# Patient Record
Sex: MALE | Race: BLACK OR AFRICAN AMERICAN | NOT HISPANIC OR LATINO | Employment: STUDENT | URBAN - METROPOLITAN AREA
[De-identification: names, ages, dates, MRNs, and addresses within clinical notes are randomized per-mention and may not be internally consistent; named-entity substitution may affect disease eponyms.]

---

## 2023-02-25 PROBLEM — H52.31 ANISOMETROPIA: Status: ACTIVE | Noted: 2023-02-25

## 2023-02-25 PROBLEM — R59.0 LYMPHADENOPATHY, AXILLARY: Status: ACTIVE | Noted: 2023-02-25

## 2023-02-25 PROBLEM — H52.03 AXIAL HYPERMETROPIA OF BOTH EYES: Status: ACTIVE | Noted: 2023-02-25

## 2023-02-25 PROBLEM — E83.19 OTHER DISORDERS OF IRON METABOLISM: Status: ACTIVE | Noted: 2023-02-25

## 2023-02-25 PROBLEM — Q21.0 PERIMEMBRANOUS VENTRICULAR SEPTAL DEFECT (HHS-HCC): Status: ACTIVE | Noted: 2023-02-25

## 2023-02-25 PROBLEM — R46.89 BEHAVIOR CONCERN: Status: ACTIVE | Noted: 2023-02-25

## 2023-02-25 PROBLEM — R01.1 MURMUR: Status: ACTIVE | Noted: 2023-02-25

## 2023-02-25 PROBLEM — G47.33 OBSTRUCTIVE SLEEP APNEA SYNDROME: Status: ACTIVE | Noted: 2023-02-25

## 2023-02-25 PROBLEM — L85.3 DRY SKIN DERMATITIS: Status: ACTIVE | Noted: 2023-02-25

## 2023-02-25 PROBLEM — L02.412 ABSCESS OF AXILLA, LEFT: Status: ACTIVE | Noted: 2023-02-25

## 2023-02-25 PROBLEM — H52.13 MYOPIA OF BOTH EYES: Status: ACTIVE | Noted: 2023-02-25

## 2023-02-25 PROBLEM — J06.9 URI (UPPER RESPIRATORY INFECTION): Status: ACTIVE | Noted: 2023-02-25

## 2023-02-25 PROBLEM — Q90.9 TRISOMY 21 (HHS-HCC): Status: ACTIVE | Noted: 2023-02-25

## 2023-02-25 PROBLEM — H10.10 ALLERGIC CONJUNCTIVITIS: Status: ACTIVE | Noted: 2023-02-25

## 2023-02-25 PROBLEM — J30.9 ALLERGIC RHINITIS: Status: ACTIVE | Noted: 2023-02-25

## 2023-02-25 PROBLEM — R25.8 NOCTURNAL LEG MOVEMENTS: Status: ACTIVE | Noted: 2023-02-25

## 2023-02-25 PROBLEM — F80.9 DELAYED SPEECH: Status: ACTIVE | Noted: 2023-02-25

## 2023-02-25 PROBLEM — L70.9 ACNE: Status: ACTIVE | Noted: 2023-02-25

## 2023-02-25 PROBLEM — L02.411 ABSCESS OF AXILLA, RIGHT: Status: ACTIVE | Noted: 2023-02-25

## 2023-02-25 PROBLEM — H52.201 ASTIGMATISM OF RIGHT EYE: Status: ACTIVE | Noted: 2023-02-25

## 2023-02-25 PROBLEM — J30.2 SEASONAL ALLERGIES: Status: ACTIVE | Noted: 2023-02-25

## 2023-02-25 PROBLEM — F81.89 DEVELOPMENTAL NON-VERBAL DISORDER: Status: ACTIVE | Noted: 2023-02-25

## 2023-02-25 PROBLEM — L84 CALLUS OF FOOT: Status: ACTIVE | Noted: 2023-02-25

## 2023-02-25 PROBLEM — L73.2 HIDRADENITIS SUPPURATIVA: Status: ACTIVE | Noted: 2023-02-25

## 2023-02-25 RX ORDER — CETIRIZINE HYDROCHLORIDE 5 MG/5ML
10 SOLUTION ORAL DAILY PRN
COMMUNITY
Start: 2018-05-14

## 2023-02-25 RX ORDER — BENZOYL PEROXIDE 100 MG/ML
LIQUID TOPICAL
COMMUNITY
Start: 2019-01-14

## 2023-03-06 ENCOUNTER — OFFICE VISIT (OUTPATIENT)
Dept: PRIMARY CARE | Facility: CLINIC | Age: 23
End: 2023-03-06
Payer: COMMERCIAL

## 2023-03-06 VITALS
HEART RATE: 54 BPM | BODY MASS INDEX: 21.9 KG/M2 | DIASTOLIC BLOOD PRESSURE: 66 MMHG | HEIGHT: 61 IN | OXYGEN SATURATION: 100 % | SYSTOLIC BLOOD PRESSURE: 106 MMHG | TEMPERATURE: 98.1 F | WEIGHT: 116 LBS

## 2023-03-06 DIAGNOSIS — Z00.00 PHYSICAL EXAM, ROUTINE: Primary | ICD-10-CM

## 2023-03-06 DIAGNOSIS — F80.9 DELAYED SPEECH: ICD-10-CM

## 2023-03-06 DIAGNOSIS — R01.1 MURMUR: ICD-10-CM

## 2023-03-06 DIAGNOSIS — F81.89 DEVELOPMENTAL NON-VERBAL DISORDER: ICD-10-CM

## 2023-03-06 PROCEDURE — 99213 OFFICE O/P EST LOW 20 MIN: CPT | Performed by: FAMILY MEDICINE

## 2023-03-06 RX ORDER — ACETAMINOPHEN 160 MG
10 TABLET,CHEWABLE ORAL
COMMUNITY
Start: 2022-05-17

## 2023-03-06 NOTE — PROGRESS NOTES
"Subjective   Chief complaint: Declan Brown is a 22 y.o. male who presents for Annual Exam.  Patient Active Problem List   Diagnosis    Abscess of axilla, left    Abscess of axilla, right    Acne    Allergic conjunctivitis    Allergic rhinitis    Astigmatism of right eye    Anisometropia    Axial hypermetropia of both eyes    Behavior concern    Callus of foot    Delayed speech    Developmental non-verbal disorder    Dry skin dermatitis    Hidradenitis suppurativa    Lymphadenopathy, axillary    Murmur    Myopia of both eyes    Nocturnal leg movements    Obstructive sleep apnea syndrome    Other disorders of iron metabolism    Perimembranous ventricular septal defect    Seasonal allergies    Trisomy 21    URI (upper respiratory infection)     HPI:  HPI  Here with his care giver and reports no new symptoms or issues, needs a physical for starting adult care program. Recently finished high school.Patient is non verbal.        Review of Systems  Unable to obtain, does not seem to be in pain.    Objective   /66 (BP Location: Right arm, Patient Position: Sitting)   Pulse 54   Temp 36.7 °C (98.1 °F) (Temporal)   Ht 1.549 m (5' 1\")   Wt 52.6 kg (116 lb)   SpO2 100%   BMI 21.92 kg/m²     General Appearance:     no distress, appears smaller than stated age   Head:    Normocephalic, without obvious abnormality, atraumatic   Ears:    Normal TM's and external ear canals, both ears   Throat:   Lips, mucosa, and tongue normal; teeth and gums normal   Neck:   Supple, symmetrical, trachea midline, no adenopathy;        thyroid:  No enlargement/tenderness/nodules; no carotid    bruit or JVD   Lungs:     Clear to auscultation bilaterally, respirations unlabored   Heart:    Regular rate and rhythm, Systolic murmur.   Abdomen:     Soft, non-tender, bowel sounds active all four quadrants,     no masses, no organomegaly   Extremities:   Extremities normal, atraumatic, no cyanosis or edema            I have reviewed and " reconciled the medication list with the patient today.   Current Outpatient Medications:     loratadine (Claritin) 5 mg/5 mL syrup, Take 10 mL (10 mg) by mouth., Disp: , Rfl:     benzoyl peroxide (Benzac AC) 10 % external wash, Please see attached for detailed directions, Disp: , Rfl:     cetirizine 5 mg/5 mL solution, Take 10 mL by mouth once daily as needed (for allergies)., Disp: , Rfl:      Imaging:  No results found.     Labs reviewed:    Lab Results   Component Value Date    WBC 2.6 (L) 11/02/2021    HGB 14.1 11/02/2021    HCT 43.3 11/02/2021     11/02/2021    CHOL 139 11/02/2021    TRIG 51 11/02/2021    HDL 47.8 11/02/2021    ALT 15 11/02/2021    AST 18 11/02/2021     11/02/2021    K 4.7 11/02/2021     11/02/2021    CREATININE 1.07 11/02/2021    BUN 12 11/02/2021    CO2 31 11/02/2021    TSH 0.85 04/26/2019    HGBA1C 5.1 11/02/2021       Assessment/Plan   Problem List Items Addressed This Visit          Nervous    Delayed speech:unchanged, completed [employer] school       Circulatory    Murmur: ASD: cardiology follow up.       Other    Developmental non-verbal disorder     Other Visit Diagnoses       Physical exam, routine    -  Primary: up to date on flu and Covid vaccines          Diagnosis and Management discussed with the patient.  Patient agreeable with plan.  Patient advised to Return to clinic with new or unresolved symptoms.  Miles Richardson MD

## 2023-08-02 ENCOUNTER — APPOINTMENT (OUTPATIENT)
Dept: LAB | Facility: LAB | Age: 23
End: 2023-08-02
Payer: COMMERCIAL

## 2023-08-02 LAB — THYROTROPIN (MIU/L) IN SER/PLAS BY DETECTION LIMIT <= 0.05 MIU/L: 2.1 MIU/L (ref 0.44–3.98)

## 2024-01-10 ENCOUNTER — APPOINTMENT (OUTPATIENT)
Dept: DERMATOLOGY | Facility: CLINIC | Age: 24
End: 2024-01-10
Payer: COMMERCIAL

## 2024-01-27 DIAGNOSIS — L73.2 HIDRADENITIS SUPPURATIVA: Primary | ICD-10-CM

## 2024-01-29 RX ORDER — BENZOYL PEROXIDE 50 MG/ML
LIQUID TOPICAL
Qty: 227 G | Refills: 11 | Status: SHIPPED | OUTPATIENT
Start: 2024-01-29

## 2024-01-29 RX ORDER — DOXYCYCLINE 100 MG/1
TABLET ORAL
Qty: 180 TABLET | Refills: 3 | Status: SHIPPED | OUTPATIENT
Start: 2024-01-29

## 2024-03-21 ENCOUNTER — OFFICE VISIT (OUTPATIENT)
Dept: PRIMARY CARE | Facility: CLINIC | Age: 24
End: 2024-03-21
Payer: COMMERCIAL

## 2024-03-21 VITALS
WEIGHT: 126 LBS | HEIGHT: 61 IN | BODY MASS INDEX: 23.79 KG/M2 | SYSTOLIC BLOOD PRESSURE: 102 MMHG | DIASTOLIC BLOOD PRESSURE: 63 MMHG | HEART RATE: 60 BPM | TEMPERATURE: 98 F | OXYGEN SATURATION: 100 %

## 2024-03-21 DIAGNOSIS — Q90.9 TRISOMY 21 (HHS-HCC): Primary | ICD-10-CM

## 2024-03-21 PROCEDURE — 1036F TOBACCO NON-USER: CPT

## 2024-03-21 PROCEDURE — 99213 OFFICE O/P EST LOW 20 MIN: CPT

## 2024-03-21 ASSESSMENT — PATIENT HEALTH QUESTIONNAIRE - PHQ9
1. LITTLE INTEREST OR PLEASURE IN DOING THINGS: NOT AT ALL
SUM OF ALL RESPONSES TO PHQ9 QUESTIONS 1 AND 2: 0
2. FEELING DOWN, DEPRESSED OR HOPELESS: NOT AT ALL

## 2024-03-21 ASSESSMENT — PAIN SCALES - GENERAL: PAINLEVEL: 0-NO PAIN

## 2024-03-21 ASSESSMENT — ENCOUNTER SYMPTOMS
LOSS OF SENSATION IN FEET: 0
OCCASIONAL FEELINGS OF UNSTEADINESS: 0
DEPRESSION: 0

## 2024-03-21 NOTE — PROGRESS NOTES
Precepting Note  Declan Brown  [id number]    Patient was seen in [facility] Family Medicine residency clinic today as part of a multidisciplinary teaching team. I participated in this patient's care as a bertha precepting physician.    22 yo M w/ trisomy 21 here for follow up and guardianship paper. Mother is guardian. Will screen for atlantoaxial axis and lymphoma given his increased risk.    Agreeable with plan as discussed with resident Ramirez Dooley MD and attending provider, Dr. Quiroga.      Carlee Eden MD  Family Medicine, PGY-3

## 2024-03-21 NOTE — PROGRESS NOTES
"Subjective   Patient ID: Declan Brown is a 23 y.o. male who presents for Follow-up (Pt needs paper work filled per pt mother).    Pt presents today with his mother to have paperwork filled out in regards to guardianship. Per pts mother (pt non-verbal) pt is otherwise in good health and has been following with cardiology for his heart murmur and dermatology for his bilateral axillary abscesses which have been doing well on medication.          Review of Systems  - pt non-verbal, unable to assess    Objective   /63 (BP Location: Right arm, Patient Position: Sitting)   Pulse 60   Temp 36.7 °C (98 °F)   Ht 1.549 m (5' 1\")   Wt 57.2 kg (126 lb)   SpO2 100%   BMI 23.81 kg/m²     Physical Exam  Constitutional:       General: He is not in acute distress.     Appearance: Normal appearance. He is not ill-appearing.   Cardiovascular:      Rate and Rhythm: Normal rate and regular rhythm.      Pulses: Normal pulses.      Heart sounds: Murmur heard.      No gallop.   Pulmonary:      Effort: Pulmonary effort is normal.      Breath sounds: Normal breath sounds. No stridor. No wheezing.   Abdominal:      General: Abdomen is flat. Bowel sounds are normal. There is no distension.      Palpations: Abdomen is soft. There is no mass.      Tenderness: There is no abdominal tenderness. There is no guarding.   Skin:     General: Skin is warm and dry.      Coloration: Skin is not jaundiced or pale.      Findings: No erythema or rash.      Comments: Pt did not allow examination of axillae    Neurological:      General: No focal deficit present.      Mental Status: He is alert and oriented to person, place, and time.      Comments: Non-verbal at baseline   Psychiatric:         Mood and Affect: Mood normal.         Behavior: Behavior normal.         Assessment/Plan   Problem List Items Addressed This Visit       Trisomy 21 - Primary    Relevant Orders    CBC and Auto Differential    XR cervical spine complete 4-5 views "     #Trisomy 21  - Pt already following with cardiology for VSD and audible murmur on auscultation  - Pt will have CBC w/ diff ordered and C-spine Xray's ordered for routine screening   - Pt will have guardianship paperwork completed per mothers request at this visit.  - Discussed future follow-up and routine screening intervals for CBC as every other year and C-spine imagine as every 3-5 years if everything remains within normal limits.   - All questions and concerns have answered at this time      Patient seen and discussed with attending physician Dr. Quiroga and Dr. Karey Dooley MD  Family Medicine - PGY 1

## 2024-03-26 NOTE — PROGRESS NOTES
I saw and evaluated the patient. I personally obtained the key and critical portions of the history and physical exam or was physically present for key and critical portions performed by the resident/fellow. I reviewed the resident/fellow's documentation and discussed the patient with the resident/fellow. I agree with the resident/fellow's medical decision making as documented in the note.    Cleveland Quiroga MD

## 2024-08-30 ENCOUNTER — TELEPHONE (OUTPATIENT)
Dept: PRIMARY CARE | Facility: CLINIC | Age: 24
End: 2024-08-30

## 2024-08-30 NOTE — TELEPHONE ENCOUNTER
Pt's mom came in stating she was called for jury duty, but is unable to due to taking care of the pt. Could you write her a letter stating she cannot go to jury duty? She got one filled out last time by Dr. Lambert. She said you can fax it over to them: [phone]  Her jury number is [id number] thank you!

## 2024-09-04 ENCOUNTER — APPOINTMENT (OUTPATIENT)
Dept: DERMATOLOGY | Facility: CLINIC | Age: 24
End: 2024-09-04
Payer: COMMERCIAL

## 2024-09-04 DIAGNOSIS — L28.1 PRURIGO NODULARIS: Primary | ICD-10-CM

## 2024-09-04 PROCEDURE — 99214 OFFICE O/P EST MOD 30 MIN: CPT | Performed by: DERMATOLOGY

## 2024-09-04 PROCEDURE — 1036F TOBACCO NON-USER: CPT | Performed by: DERMATOLOGY

## 2024-09-04 RX ORDER — CLOBETASOL PROPIONATE 0.5 MG/G
OINTMENT TOPICAL 2 TIMES DAILY
Qty: 60 G | Refills: 1 | Status: SHIPPED | OUTPATIENT
Start: 2024-09-04

## 2024-09-04 NOTE — PROGRESS NOTES
Subjective     Declan Brown is a 24 y.o. male who presents for the following: Rash.     Last derm visit 7/11/23 for acne and hidradenitis suppurativa. Acne at that time was stable and well-controlled with benzoyl peroxide wash in the morning and clindamycin lotion for new spots. Hidradenitis suppurativa was stable, moderately flaring but happy with that level of control, orozco stage 2/3. Continue doxycycline 100mg PO BID, benzoyl peroxide wash, and clindamycin lotion. Also with prurigo nodules, Rx triamcinolone 0.1% cream and keratosis pilaris Rx Amlactin    Of note, he has VSD, which is why we have been cautious to trial Humira for his hidradenitis suppurativa     Review of Systems:  No other skin or systemic complaints other than what is documented elsewhere in the note.    The following portions of the chart were reviewed this encounter and updated as appropriate:   Tobacco  Allergies  Meds  Problems  Med Hx  Surg Hx  Fam Hx         Skin Cancer History  No skin cancer on file.      Specialty Problems          Dermatology Problems    Acne    Callus of foot    Dry skin dermatitis    Hidradenitis suppurativa        Objective   Well appearing patient in no apparent distress; mood and affect are within normal limits.    A focused skin examination was performed. All findings within normal limits unless otherwise noted below.    Assessment/Plan   1. Prurigo nodularis  Hyperpigmented papule(s)/nodule(s) with excoriation(s), scattered on arms, legs, with one larger plaque on calf    -Discussed nature of condition  - new spots appeared again  - [place]  - mom report that Declan does not seem to be scratching it    - they have been using triamcinolone to whole body daily -- cautioned to just use every other day  - add a stronger topical steroid to these bumps just for the next 2-3 weeks      -Reviewed this condition is often difficult to treat  -Recommend to begin measures to reduce  pruritus, including emollients with pramoxine (such as CeraVe Itch Relief lotion twice daily every day), ice packs to use to interrupt itch attacks, and may also obtain over the counter Sarna lotion (keep in the fridge, apply cold whenever itching arises).     clobetasol (Temovate) 0.05 % ointment  Apply topically 2 times a day. To dark bumps on arms, legs, and trunk for 2-3 weeks and then stop        Follow up 9/25/24 as scheduled for annual follow up of hidradenitis suppurativa, acne, kp

## 2024-09-12 NOTE — TELEPHONE ENCOUNTER
Pt's mom called regarding jury duty. She wanted to know if you had faxed over the paperwork. Thanks!

## 2024-09-25 ENCOUNTER — APPOINTMENT (OUTPATIENT)
Dept: DERMATOLOGY | Facility: CLINIC | Age: 24
End: 2024-09-25
Payer: COMMERCIAL

## 2024-09-25 DIAGNOSIS — L28.1 PRURIGO NODULARIS: ICD-10-CM

## 2024-09-25 DIAGNOSIS — L73.9 FOLLICULITIS: Primary | ICD-10-CM

## 2024-09-25 DIAGNOSIS — L73.2 HIDRADENITIS SUPPURATIVA: ICD-10-CM

## 2024-09-25 PROCEDURE — 99214 OFFICE O/P EST MOD 30 MIN: CPT | Performed by: DERMATOLOGY

## 2024-09-25 PROCEDURE — 87070 CULTURE OTHR SPECIMN AEROBIC: CPT | Performed by: DERMATOLOGY

## 2024-09-25 RX ORDER — TRIAMCINOLONE ACETONIDE 1 MG/G
OINTMENT TOPICAL 2 TIMES DAILY
Qty: 454 G | Refills: 3 | Status: SHIPPED | OUTPATIENT
Start: 2024-09-25

## 2024-09-25 ASSESSMENT — DERMATOLOGY PATIENT ASSESSMENT
HAVE YOU HAD OR DO YOU HAVE VASCULAR DISEASE: NO
HAVE YOU HAD OR DO YOU HAVE A STAPH INFECTION: NO
DO YOU USE A TANNING BED: NO
DO YOU HAVE ANY NEW OR CHANGING LESIONS: YES
ARE YOU AN ORGAN TRANSPLANT RECIPIENT: NO

## 2024-09-25 ASSESSMENT — PATIENT GLOBAL ASSESSMENT (PGA): PATIENT GLOBAL ASSESSMENT: PATIENT GLOBAL ASSESSMENT:  2 - MILD

## 2024-09-25 ASSESSMENT — DERMATOLOGY QUALITY OF LIFE (QOL) ASSESSMENT
RATE HOW EMOTIONALLY BOTHERED YOU ARE BY YOUR SKIN PROBLEM (FOR EXAMPLE, WORRY, EMBARRASSMENT, FRUSTRATION): 1
RATE HOW BOTHERED YOU ARE BY SYMPTOMS OF YOUR SKIN PROBLEM (EG, ITCHING, STINGING BURNING, HURTING OR SKIN IRRITATION): 1
DATE THE QUALITY-OF-LIFE ASSESSMENT WAS COMPLETED: 67108
RATE HOW BOTHERED YOU ARE BY EFFECTS OF YOUR SKIN PROBLEMS ON YOUR ACTIVITIES (EG, GOING OUT, ACCOMPLISHING WHAT YOU WANT, WORK ACTIVITIES OR YOUR RELATIONSHIPS WITH OTHERS): 1
ARE THERE EXCLUSIONS OR EXCEPTIONS FOR THE QUALITY OF LIFE ASSESSMENT: NO

## 2024-09-25 ASSESSMENT — ITCH NUMERIC RATING SCALE: HOW SEVERE IS YOUR ITCHING?: 0

## 2024-09-25 NOTE — PROGRESS NOTES
Subjective     Declan Brown is a 24 y.o. male who presents for the following: Rash. Last derm visit 9/4/24 for virtual visit for prurigo nodularis    Review of Systems:  No other skin or systemic complaints other than what is documented elsewhere in the note.    The following portions of the chart were reviewed this encounter and updated as appropriate:          Skin Cancer History  No skin cancer on file.      Specialty Problems          Dermatology Problems    Acne    Callus of foot    Dry skin dermatitis    Hidradenitis suppurativa        Objective   Well appearing patient in no apparent distress; mood and affect are within normal limits.    A focused skin examination was performed. All findings within normal limits unless otherwise noted below.    Assessment/Plan   1. Folliculitis  Left Upper Back, Right Upper Back  Monomorphic papules and pustules on posterior shoulders    -concern for gram negative folliculitis related to long-term use of doxycycline  - also consider inflamed KP vs allergic contact dermatitis. They just switch to curel lotion when ran out of triamcinolone    - culture today        Specimen A - Tissue/Wound Culture/Smear      Related Procedures  Follow Up In Dermatology - Established Patient    2. Prurigo nodularis  Hyperpigmented papule(s)/nodule(s) with excoriation(s), scattered on arms, legs, with one larger plaque on calf    -Discussed nature of condition  - seem to be improving with clobetasol but now with folliculitis as above    - pending results of bacterial culture, could consider dupixent therapy      triamcinolone (Kenalog) 0.1 % ointment  Apply topically 2 times a day. To itchy areas on skin for up to 2 weeks then decrease to 3x per week for maintenance    Related Medications  clobetasol (Temovate) 0.05 % ointment  Apply topically 2 times a day. To dark bumps on arms, legs, and trunk for 2-3 weeks and then stop    3. Hidradenitis suppurativa  Left Axilla, Right Axilla  Dilated  comedones, inflammatory papules, sinus tract formation, scarring, flaring more in one axilla than other    -Discussed nature of condition  -Discussed treatment options  - they are happy with current level of control, will not change at this time  - humira discussed previously but would need cardiology clearance    Related Medications  benzoyl peroxide 5 % external wash  APPLY WASH TO AFFECTED AREAS ONCE DAILY IN THE MORNING. MAY BLEACH, USE OLD TOWEL TO CAREFULLY DRY    doxycycline (Adoxa) 100 mg tablet  TAKE 1 TABLET TWICE A DAY BY MOUTH CRUSHED IN FOOD        Follow up 3 months

## 2024-09-27 ENCOUNTER — TELEPHONE (OUTPATIENT)
Dept: DERMATOLOGY | Facility: CLINIC | Age: 24
End: 2024-09-27
Payer: COMMERCIAL

## 2024-09-27 LAB
BACTERIA SPEC CULT: NORMAL
GRAM STN SPEC: NORMAL
GRAM STN SPEC: NORMAL

## 2024-10-02 ENCOUNTER — TELEPHONE (OUTPATIENT)
Dept: DERMATOLOGY | Facility: CLINIC | Age: 24
End: 2024-10-02
Payer: COMMERCIAL

## 2024-10-28 ENCOUNTER — OFFICE VISIT (OUTPATIENT)
Dept: URGENT CARE | Age: 24
End: 2024-10-28
Payer: COMMERCIAL

## 2024-10-28 VITALS
BODY MASS INDEX: 26.83 KG/M2 | RESPIRATION RATE: 16 BRPM | SYSTOLIC BLOOD PRESSURE: 125 MMHG | OXYGEN SATURATION: 99 % | HEART RATE: 79 BPM | WEIGHT: 142 LBS | DIASTOLIC BLOOD PRESSURE: 55 MMHG

## 2024-10-28 DIAGNOSIS — H02.845 EDEMA OF LEFT LOWER EYELID: Primary | ICD-10-CM

## 2024-10-28 PROCEDURE — 1036F TOBACCO NON-USER: CPT | Performed by: PHYSICIAN ASSISTANT

## 2024-10-28 PROCEDURE — 99203 OFFICE O/P NEW LOW 30 MIN: CPT | Performed by: PHYSICIAN ASSISTANT

## 2024-10-28 RX ORDER — ERYTHROMYCIN 5 MG/G
OINTMENT OPHTHALMIC
Qty: 3.5 G | Refills: 0 | Status: SHIPPED | OUTPATIENT
Start: 2024-10-28

## 2024-10-28 ASSESSMENT — ENCOUNTER SYMPTOMS
CONSTITUTIONAL NEGATIVE: 1
EYE PAIN: 0
ENDOCRINE NEGATIVE: 1
NEUROLOGICAL NEGATIVE: 1
MUSCULOSKELETAL NEGATIVE: 1
HEMATOLOGIC/LYMPHATIC NEGATIVE: 1
EYE DISCHARGE: 0
EYE REDNESS: 0
CARDIOVASCULAR NEGATIVE: 1
RESPIRATORY NEGATIVE: 1
GASTROINTESTINAL NEGATIVE: 1
PSYCHIATRIC NEGATIVE: 1
ALLERGIC/IMMUNOLOGIC NEGATIVE: 1

## 2024-11-20 ENCOUNTER — APPOINTMENT (OUTPATIENT)
Dept: DERMATOLOGY | Facility: CLINIC | Age: 24
End: 2024-11-20
Payer: COMMERCIAL

## 2024-11-20 DIAGNOSIS — Z51.81 ENCOUNTER FOR THERAPEUTIC DRUG LEVEL MONITORING: ICD-10-CM

## 2024-11-20 DIAGNOSIS — L70.9 ACNE WITH GRAM NEGATIVE FOLLICULITIS: Primary | ICD-10-CM

## 2024-11-20 DIAGNOSIS — L73.9 FOLLICULITIS: ICD-10-CM

## 2024-11-20 DIAGNOSIS — L28.1 PRURIGO NODULARIS: ICD-10-CM

## 2024-11-20 DIAGNOSIS — B96.89 ACNE WITH GRAM NEGATIVE FOLLICULITIS: Primary | ICD-10-CM

## 2024-11-20 DIAGNOSIS — L73.2 HIDRADENITIS SUPPURATIVA: ICD-10-CM

## 2024-11-20 DIAGNOSIS — L73.9 ACNE WITH GRAM NEGATIVE FOLLICULITIS: Primary | ICD-10-CM

## 2024-11-20 PROCEDURE — 1036F TOBACCO NON-USER: CPT | Performed by: DERMATOLOGY

## 2024-11-20 PROCEDURE — 99214 OFFICE O/P EST MOD 30 MIN: CPT | Performed by: DERMATOLOGY

## 2024-11-20 NOTE — PROGRESS NOTES
Subjective     Declan Brown is a 24 y.o. male who presents for the following: Acne. Last derm visit 9/25/24 for folliculitis. Bacterial culture collected on that date demonstrated gram negative bacteria. Today's visit is to discuss isotretinoin therapy.     Review of Systems:  No other skin or systemic complaints other than what is documented elsewhere in the note.    The following portions of the chart were reviewed this encounter and updated as appropriate:   Tobacco  Allergies  Meds  Problems  Med Hx  Surg Hx         Skin Cancer History  No skin cancer on file.      Specialty Problems          Dermatology Problems    Acne    Callus of foot    Dry skin dermatitis    Hidradenitis suppurativa        Objective   Well appearing patient in no apparent distress; mood and affect are within normal limits.    A focused skin examination was performed. All findings within normal limits unless otherwise noted below.    Assessment/Plan   1. Acne with gram negative folliculitis  Monomorphic papules on trunk, arms, and legs. Limited exam on virtual visit today    -Given diagnosis of gram negative folliculitis, the best appropriate therapy is isotretinoin   - previous therapy for his acne included doxycycline PO, tretinoin, benzoyl peroxide, clindamcyin topically     -Need to check AST, ALT, TG prior to prescription, lab order placed    -Potential side effects reviewed with the patient and mother today including teratogenicity and birth defects, lipid abnormalities (hyperTGL which may result in pancreatitis), liver or kidney failure, risk of depression or suicide, risk of potential inflammatory bowel disease  -Common and expected side effects include xerosis (dryness) of the lips and skin, nose bleeds due to dryness of the nasal mucosa, and redness/rash of the skin (retinoid dermatitis). -Recommend liberal emollients (Vaseline to the lips, Aveeno Eczema Therapy to the body) to be used daily.  -Discussed with the patient  that they should not get anyone pregnant while on therapy, do not donate blood, and do not give their medication to anyone. The patient verbalizes understanding and agreement.    -Signed iPledge consent form will be signed by mother and guardian    -If labs permissible, plan to continue rx Accutane/Isotretinoin as tolerated until a cumulative dose of 220 mg/kg is obtained.   -Patient is to take the medication with a fatty food/meal to aid in absorption of the medication (if not taking branded Absorica)    -REMS ID:   -Pt weight (kg): 64.4 kg  -Goal dose (220mg/kg):  -Current cumulative dose:    - they will stop all other acne medications including doxycycline    Related Procedures  Aspartate Aminotransferase  Alanine Aminotransferase  Triglycerides    2. Folliculitis    Related Procedures  Follow Up In Dermatology - Established Patient    3. Hidradenitis suppurativa  Left Axilla, Right Axilla  Limited exam on virtual visit today    - humira discussed previously but would need cardiology clearance  - need to stop doxycycline and benzoyl peroxide due to gram negative folliculitis  - isotretinoin may help with hidradenitis suppurativa     4. Prurigo nodularis  Limited exam on virtual visit today    -Discussed nature of condition  -may continue topical steroids as directed and as needed    Related Medications  clobetasol (Temovate) 0.05 % ointment  Apply topically 2 times a day. To dark bumps on arms, legs, and trunk for 2-3 weeks and then stop    triamcinolone (Kenalog) 0.1 % ointment  Apply topically 2 times a day. To itchy areas on skin for up to 2 weeks then decrease to 3x per week for maintenance    5. Encounter for therapeutic drug level monitoring    Related Procedures  Aspartate Aminotransferase  Alanine Aminotransferase  Triglycerides        Follow up pending receipt of labs and signed consent form

## 2024-11-20 NOTE — Clinical Note
Mom will stop by [place] to sign ipledge consent - wanted you to know just in case she comes before I arrive tomorrow

## 2024-11-20 NOTE — PATIENT INSTRUCTIONS
Your homework:  - Get blood drawn at any  lab, must be fasting at least 8 hours. No food but water is OK  - Come to [place] dermatology office to sign paperwork, just mom      Stop all the acne medicines (doxycycline, benzoyl peroxide, tretinoin, clindamycin)  Stop the amlactin    You may continue triamcinolone and clobetasol as directed for skin dryness and itching    The Eucerin you have is good  Vaseline for lips  Dove soap to wash      Isotretinoin at home from [facility]    Soft gelatin capsules can be swallowed whole (preferred) or chewed and swallowed.    If your child cannot swallow the capsules whole, there are other ways to take this medicine. The capsule may be softened by placing in a small cup with warm water or milk for 2-3 minutes, and then:    Drink the liquid and the softened capsule.  Chew or swallow the softened capsule and discard the liquid.  Place the softened capsule on a spoonful of food and eat.  Puncture the capsule and squeeze the medicine into a spoonful of food such as peanut butter, pudding, or ice cream.  Isotretinoin can be given by feeding tube. Follow the instructions given by your doctor or pharmacist.    Store capsules at room temperature. Protect from light.  Give a missed dose as soon as possible. If it is near the time of the next dose, skip the dose. Do not give 2 doses at the same time.  Do not use the medicine past the expiration date.  Follow instructions for safe handling, storage, and disposal.

## 2024-11-29 ENCOUNTER — LAB (OUTPATIENT)
Dept: LAB | Facility: LAB | Age: 24
End: 2024-11-29
Payer: COMMERCIAL

## 2024-11-29 DIAGNOSIS — L70.9 ACNE WITH GRAM NEGATIVE FOLLICULITIS: ICD-10-CM

## 2024-11-29 DIAGNOSIS — L73.9 ACNE WITH GRAM NEGATIVE FOLLICULITIS: ICD-10-CM

## 2024-11-29 DIAGNOSIS — B96.89 ACNE WITH GRAM NEGATIVE FOLLICULITIS: ICD-10-CM

## 2024-11-29 DIAGNOSIS — Z51.81 ENCOUNTER FOR THERAPEUTIC DRUG LEVEL MONITORING: ICD-10-CM

## 2024-11-29 LAB
ALT SERPL W P-5'-P-CCNC: 18 U/L (ref 10–52)
AST SERPL W P-5'-P-CCNC: 20 U/L (ref 9–39)
TRIGL SERPL-MCNC: 35 MG/DL (ref 0–114)

## 2024-11-29 PROCEDURE — 84478 ASSAY OF TRIGLYCERIDES: CPT

## 2024-11-29 PROCEDURE — 84450 TRANSFERASE (AST) (SGOT): CPT

## 2024-11-29 PROCEDURE — 36415 COLL VENOUS BLD VENIPUNCTURE: CPT

## 2024-11-29 PROCEDURE — 84460 ALANINE AMINO (ALT) (SGPT): CPT

## 2024-12-12 ENCOUNTER — TELEPHONE (OUTPATIENT)
Dept: DERMATOLOGY | Facility: CLINIC | Age: 24
End: 2024-12-12
Payer: COMMERCIAL

## 2025-01-02 ENCOUNTER — APPOINTMENT (OUTPATIENT)
Dept: DERMATOLOGY | Facility: CLINIC | Age: 25
End: 2025-01-02
Payer: COMMERCIAL

## 2025-01-02 DIAGNOSIS — L70.9 ACNE WITH GRAM NEGATIVE FOLLICULITIS: Primary | ICD-10-CM

## 2025-01-02 DIAGNOSIS — L73.9 ACNE WITH GRAM NEGATIVE FOLLICULITIS: Primary | ICD-10-CM

## 2025-01-02 DIAGNOSIS — B96.89 ACNE WITH GRAM NEGATIVE FOLLICULITIS: Primary | ICD-10-CM

## 2025-01-02 DIAGNOSIS — L73.2 HIDRADENITIS SUPPURATIVA: ICD-10-CM

## 2025-01-02 PROCEDURE — 1036F TOBACCO NON-USER: CPT | Performed by: DERMATOLOGY

## 2025-01-02 PROCEDURE — 99214 OFFICE O/P EST MOD 30 MIN: CPT | Performed by: DERMATOLOGY

## 2025-01-02 RX ORDER — ISOTRETINOIN 30 MG/1
60 CAPSULE ORAL DAILY
Qty: 60 CAPSULE | Refills: 0 | Status: SHIPPED | OUTPATIENT
Start: 2025-01-02 | End: 2025-02-01

## 2025-01-02 NOTE — PATIENT INSTRUCTIONS
Isotretinoin at home from [facility]     Soft gelatin capsules can be swallowed whole (preferred) or chewed and swallowed.     If your child cannot swallow the capsules whole, there are other ways to take this medicine. The capsule may be softened by placing in a small cup with warm water or milk for 2-3 minutes, and then:     Drink the liquid and the softened capsule.  Chew or swallow the softened capsule and discard the liquid.  Place the softened capsule on a spoonful of food and eat.  Puncture the capsule and squeeze the medicine into a spoonful of food such as peanut butter, pudding, or ice cream.  Isotretinoin can be given by feeding tube. Follow the instructions given by your doctor or pharmacist.     Store capsules at room temperature. Protect from light.  Give a missed dose as soon as possible. If it is near the time of the next dose, skip the dose. Do not give 2 doses at the same time.  Do not use the medicine past the expiration date.  Follow instructions for safe handling, storage, and disposal.

## 2025-01-02 NOTE — PROGRESS NOTES
Subjective     Declan Brown is a 24 y.o. male who presents for the following: Acne. Last derm visit 11/20/24 for acne with gram negative folliculitis, hidradenitis suppurativa, prurigo nodularis    They filled isotretinoin 12/6/24. Not able to take every day. Declan cannot take pills. She forget to check MyChart    She has half the pills            Review of Systems:  No other skin or systemic complaints other than what is documented elsewhere in the note.    The following portions of the chart were reviewed this encounter and updated as appropriate:   Tobacco  Allergies  Meds  Problems  Med Hx  Surg Hx         Skin Cancer History  No skin cancer on file.      Specialty Problems          Dermatology Problems    Acne    Callus of foot    Dry skin dermatitis    Hidradenitis suppurativa        Objective   Well appearing patient in no apparent distress; mood and affect are within normal limits.    A focused skin examination was performed. All findings within normal limits unless otherwise noted below.    Assessment/Plan   1. Acne with gram negative folliculitis  Monomorphic papules on trunk, arms, and legs. Limited exam on virtual visit today    -Given diagnosis of gram negative folliculitis, the best appropriate therapy is isotretinoin   - previous therapy for his acne included doxycycline PO, tretinoin, benzoyl peroxide, clindamcyin topically     -Baseline labs 11/29/24 AST, ALT, TG within normal limits     -Potential side effects reviewed with the patient and mother today including teratogenicity and birth defects, lipid abnormalities (hyperTGL which may result in pancreatitis), liver or kidney failure, risk of depression or suicide, risk of potential inflammatory bowel disease  -Common and expected side effects include xerosis (dryness) of the lips and skin, nose bleeds due to dryness of the nasal mucosa, and redness/rash of the skin (retinoid dermatitis). -Recommend liberal emollients (Vaseline to the  lips, Aveeno Eczema Therapy to the body) to be used daily.  -Discussed with the patient that they should not get anyone pregnant while on therapy, do not donate blood, and do not give their medication to anyone. The patient verbalizes understanding and agreement.    -Signed iPledge consent form will be signed by mother and guardian    -If labs permissible, plan to continue rx Accutane/Isotretinoin as tolerated until a cumulative dose of 220 mg/kg is obtained.   -Patient is to take the medication with a fatty food/meal to aid in absorption of the medication (if not taking branded Absorica)    [facility]   -Pt weight (kg): 64.4 kg  -Goal dose (220mg/kg):  -Current cumulative dose: 14 mg/kg (calculated based on Rx sent, he still has 14 pills of 30mg capsules at home)    - Month 1: 30mg PO daily    - Plan to increase to 60mg PO daily  - We reviewed instructions again per [facility] when children who cannot swallow pills need isotretinoin  - Dissolve capsule in warm water or mild for 2-3 minutes and then mix with peanut butter, pudding, or ice cream     - will need to check labs again when at treatment dose    - confirm in [employer] and Rx sent    Related Procedures  Follow Up In Dermatology - Established Patient    Related Medications  ISOtretinoin (Accutane) 30 mg capsule  Take 2 capsules (60 mg) by mouth once daily. Dissolve capsule in warm water or mild for 2-3 minutes and then mix with peanut butter, pudding, or ice cream    2. Hidradenitis suppurativa  Left Axilla, Right Axilla  Limited exam on virtual visit today    - humira discussed previously but would need cardiology clearance  - need to stop doxycycline and benzoyl peroxide due to gram negative folliculitis  - isotretinoin may help with hidradenitis suppurativa         Follow up 1 month acne virtual visit

## 2025-02-05 ENCOUNTER — APPOINTMENT (OUTPATIENT)
Dept: DERMATOLOGY | Facility: CLINIC | Age: 25
End: 2025-02-05
Payer: COMMERCIAL

## 2025-02-05 DIAGNOSIS — K13.0 CHEILITIS: ICD-10-CM

## 2025-02-05 DIAGNOSIS — Z51.81 ENCOUNTER FOR THERAPEUTIC DRUG LEVEL MONITORING: ICD-10-CM

## 2025-02-05 DIAGNOSIS — L73.9 ACNE WITH GRAM NEGATIVE FOLLICULITIS: Primary | ICD-10-CM

## 2025-02-05 DIAGNOSIS — L73.2 HIDRADENITIS SUPPURATIVA: ICD-10-CM

## 2025-02-05 DIAGNOSIS — B96.89 ACNE WITH GRAM NEGATIVE FOLLICULITIS: Primary | ICD-10-CM

## 2025-02-05 DIAGNOSIS — L70.9 ACNE WITH GRAM NEGATIVE FOLLICULITIS: Primary | ICD-10-CM

## 2025-02-05 PROCEDURE — 1036F TOBACCO NON-USER: CPT | Performed by: DERMATOLOGY

## 2025-02-05 PROCEDURE — 99214 OFFICE O/P EST MOD 30 MIN: CPT | Performed by: DERMATOLOGY

## 2025-02-05 RX ORDER — ISOTRETINOIN 30 MG/1
60 CAPSULE ORAL DAILY
Qty: 60 CAPSULE | Refills: 0 | Status: SHIPPED | OUTPATIENT
Start: 2025-02-05 | End: 2025-03-07

## 2025-02-05 RX ORDER — HYDROCORTISONE 25 MG/G
OINTMENT TOPICAL 2 TIMES DAILY
Qty: 30 G | Refills: 3 | Status: SHIPPED | OUTPATIENT
Start: 2025-02-05

## 2025-02-05 ASSESSMENT — PATIENT GLOBAL ASSESSMENT (PGA): WHAT IS THE PGA: PATIENT GLOBAL ASSESSMENT:  2 - MILD

## 2025-02-05 ASSESSMENT — DERMATOLOGY QUALITY OF LIFE (QOL) ASSESSMENT
RATE HOW EMOTIONALLY BOTHERED YOU ARE BY YOUR SKIN PROBLEM (FOR EXAMPLE, WORRY, EMBARRASSMENT, FRUSTRATION): 1
WHAT SINGLE SKIN CONDITION LISTED BELOW IS THE PATIENT ANSWERING THE QUALITY-OF-LIFE ASSESSMENT QUESTIONS ABOUT: ACNE
RATE HOW BOTHERED YOU ARE BY EFFECTS OF YOUR SKIN PROBLEMS ON YOUR ACTIVITIES (EG, GOING OUT, ACCOMPLISHING WHAT YOU WANT, WORK ACTIVITIES OR YOUR RELATIONSHIPS WITH OTHERS): 2
RATE HOW BOTHERED YOU ARE BY SYMPTOMS OF YOUR SKIN PROBLEM (EG, ITCHING, STINGING BURNING, HURTING OR SKIN IRRITATION): 3
RATE HOW BOTHERED YOU ARE BY SYMPTOMS OF YOUR SKIN PROBLEM (EG, ITCHING, STINGING BURNING, HURTING OR SKIN IRRITATION): 3
RATE HOW BOTHERED YOU ARE BY EFFECTS OF YOUR SKIN PROBLEMS ON YOUR ACTIVITIES (EG, GOING OUT, ACCOMPLISHING WHAT YOU WANT, WORK ACTIVITIES OR YOUR RELATIONSHIPS WITH OTHERS): 2
RATE HOW EMOTIONALLY BOTHERED YOU ARE BY YOUR SKIN PROBLEM (FOR EXAMPLE, WORRY, EMBARRASSMENT, FRUSTRATION): 1
DATE THE QUALITY-OF-LIFE ASSESSMENT WAS COMPLETED: 67241
WHAT SINGLE SKIN CONDITION LISTED BELOW IS THE PATIENT ANSWERING THE QUALITY-OF-LIFE ASSESSMENT QUESTIONS ABOUT: ACNE

## 2025-02-05 NOTE — PROGRESS NOTES
Subjective     Declan Brown is a 24 y.o. male who presents for the following: Acne. Last derm visit 1/2/25 for gram negative folliculitis on isotretinoin    His lips are cracking    Review of Systems:  No other skin or systemic complaints other than what is documented elsewhere in the note.    The following portions of the chart were reviewed this encounter and updated as appropriate:   Tobacco  Allergies  Meds  Problems  Med Hx  Surg Hx         Skin Cancer History  No skin cancer on file.      Specialty Problems          Dermatology Problems    Acne    Callus of foot    Dry skin dermatitis    Hidradenitis suppurativa        Objective   Well appearing patient in no apparent distress; mood and affect are within normal limits.    A focused skin examination was performed. All findings within normal limits unless otherwise noted below.    Assessment/Plan   1. Acne with gram negative folliculitis  Monomorphic papules on trunk, arms, and legs. Limited exam on virtual visit today    -Given diagnosis of gram negative folliculitis, the best appropriate therapy is isotretinoin   - previous therapy for his acne included doxycycline PO, tretinoin, benzoyl peroxide, clindamcyin topically     -Baseline labs 11/29/24 AST, ALT, TG within normal limits     -Potential side effects reviewed with the patient and mother today including teratogenicity and birth defects, lipid abnormalities (hyperTGL which may result in pancreatitis), liver or kidney failure, risk of depression or suicide, risk of potential inflammatory bowel disease  -Common and expected side effects include xerosis (dryness) of the lips and skin, nose bleeds due to dryness of the nasal mucosa, and redness/rash of the skin (retinoid dermatitis). -Recommend liberal emollients (Vaseline to the lips, Aveeno Eczema Therapy to the body) to be used daily.  -Discussed with the patient that they should not get anyone pregnant while on therapy, do not donate blood, and  do not give their medication to anyone. The patient verbalizes understanding and agreement.    -Signed iPledge consent form will be signed by mother and guardian    -If labs permissible, plan to continue rx Accutane/Isotretinoin as tolerated until a cumulative dose of 220 mg/kg is obtained.   -Patient is to take the medication with a fatty food/meal to aid in absorption of the medication (if not taking branded Absorica)    -REMS ID:   -Pt weight (kg): 64.4 kg  -Goal dose (220mg/kg):  -Current cumulative dose: 42 mg/kg (calculated based on Rx sent, he is about 2 weeks behind    - Month 1: 30mg PO daily (difficulty first month with how to break capsules in food)  - Month 2: 30mg PO BID (alert from [facility] that Rx was not filled, but mother reports increasing to BID, though delayed)    - Plan to maintain 60mg PO daily  - We reviewed instructions again per [facility] when children who cannot swallow pills need isotretinoin  - Dissolve capsule in warm water or mild for 2-3 minutes and then mix with peanut butter, pudding, or ice cream     - will need to check labs again before next visit, orders sent and will be mailed to home paper order    - confirm in [facility] and Rx sent    hydrocortisone 2.5 % ointment  Apply topically 2 times a day. To lips for 2 weeks then decrease to 3x per week (MWF) for maintenance    Related Procedures  Follow Up In Dermatology - Established Patient  Aspartate Aminotransferase  Alanine Aminotransferase  Triglycerides  Follow Up In Dermatology - Established Patient    Related Medications  ISOtretinoin (Accutane) 30 mg capsule  Take 2 capsules (60 mg) by mouth once daily. Dissolve capsule in warm water or mild for 2-3 minutes and then mix with peanut butter, pudding, or ice cream    2. Hidradenitis suppurativa  Left Axilla, Right Axilla  Limited exam on virtual visit today    - humira discussed previously but would need cardiology clearance  - need to stop doxycycline and benzoyl peroxide due to  gram negative folliculitis  - isotretinoin may help with hidradenitis suppurativa     3. Encounter for therapeutic drug level monitoring    Related Procedures  Aspartate Aminotransferase  Alanine Aminotransferase  Triglycerides    4. Cheilitis  Lips  Dry cracked lips    - rx hydrocortisone to use during isotretinoin therapy    Related Medications  hydrocortisone 2.5 % ointment  Apply topically 2 times a day. To lips for 2 weeks then decrease to 3x per week (MWF) for maintenance        Follow up 1 month acne virtual visit

## 2025-03-10 ENCOUNTER — PATIENT MESSAGE (OUTPATIENT)
Dept: DERMATOLOGY | Facility: CLINIC | Age: 25
End: 2025-03-10
Payer: COMMERCIAL

## 2025-03-14 ENCOUNTER — PATIENT MESSAGE (OUTPATIENT)
Dept: DERMATOLOGY | Facility: CLINIC | Age: 25
End: 2025-03-14
Payer: COMMERCIAL

## 2025-03-14 DIAGNOSIS — L01.00 IMPETIGO: Primary | ICD-10-CM

## 2025-03-16 LAB
ALT SERPL-CCNC: 13 U/L (ref 9–46)
AST SERPL-CCNC: 15 U/L (ref 10–40)
TRIGL SERPL-MCNC: 43 MG/DL

## 2025-03-18 RX ORDER — MUPIROCIN 20 MG/G
OINTMENT TOPICAL
Qty: 22 G | Refills: 3 | Status: SHIPPED | OUTPATIENT
Start: 2025-03-18

## 2025-04-02 ENCOUNTER — APPOINTMENT (OUTPATIENT)
Dept: DERMATOLOGY | Facility: CLINIC | Age: 25
End: 2025-04-02
Payer: COMMERCIAL

## 2025-04-02 DIAGNOSIS — L70.9 ACNE WITH GRAM NEGATIVE FOLLICULITIS: Primary | ICD-10-CM

## 2025-04-02 DIAGNOSIS — K13.0 CHEILITIS: ICD-10-CM

## 2025-04-02 DIAGNOSIS — L73.2 HIDRADENITIS SUPPURATIVA: ICD-10-CM

## 2025-04-02 DIAGNOSIS — B96.89 ACNE WITH GRAM NEGATIVE FOLLICULITIS: Primary | ICD-10-CM

## 2025-04-02 DIAGNOSIS — L73.9 ACNE WITH GRAM NEGATIVE FOLLICULITIS: Primary | ICD-10-CM

## 2025-04-02 PROCEDURE — 99214 OFFICE O/P EST MOD 30 MIN: CPT | Performed by: DERMATOLOGY

## 2025-04-02 PROCEDURE — 1036F TOBACCO NON-USER: CPT | Performed by: DERMATOLOGY

## 2025-04-02 RX ORDER — ISOTRETINOIN 30 MG/1
60 CAPSULE ORAL DAILY
Qty: 60 CAPSULE | Refills: 0 | Status: SHIPPED | OUTPATIENT
Start: 2025-04-02 | End: 2025-05-02

## 2025-04-02 ASSESSMENT — DERMATOLOGY QUALITY OF LIFE (QOL) ASSESSMENT
RATE HOW EMOTIONALLY BOTHERED YOU ARE BY YOUR SKIN PROBLEM (FOR EXAMPLE, WORRY, EMBARRASSMENT, FRUSTRATION): 0 - NEVER BOTHERED
WHAT SINGLE SKIN CONDITION LISTED BELOW IS THE PATIENT ANSWERING THE QUALITY-OF-LIFE ASSESSMENT QUESTIONS ABOUT: ACNE
WHAT SINGLE SKIN CONDITION LISTED BELOW IS THE PATIENT ANSWERING THE QUALITY-OF-LIFE ASSESSMENT QUESTIONS ABOUT: ACNE
RATE HOW EMOTIONALLY BOTHERED YOU ARE BY YOUR SKIN PROBLEM (FOR EXAMPLE, WORRY, EMBARRASSMENT, FRUSTRATION): 0 - NEVER BOTHERED
RATE HOW BOTHERED YOU ARE BY SYMPTOMS OF YOUR SKIN PROBLEM (EG, ITCHING, STINGING BURNING, HURTING OR SKIN IRRITATION): 2
RATE HOW BOTHERED YOU ARE BY SYMPTOMS OF YOUR SKIN PROBLEM (EG, ITCHING, STINGING BURNING, HURTING OR SKIN IRRITATION): 2
RATE HOW BOTHERED YOU ARE BY EFFECTS OF YOUR SKIN PROBLEMS ON YOUR ACTIVITIES (EG, GOING OUT, ACCOMPLISHING WHAT YOU WANT, WORK ACTIVITIES OR YOUR RELATIONSHIPS WITH OTHERS): 1
RATE HOW BOTHERED YOU ARE BY EFFECTS OF YOUR SKIN PROBLEMS ON YOUR ACTIVITIES (EG, GOING OUT, ACCOMPLISHING WHAT YOU WANT, WORK ACTIVITIES OR YOUR RELATIONSHIPS WITH OTHERS): 1

## 2025-04-02 ASSESSMENT — PATIENT GLOBAL ASSESSMENT (PGA): WHAT IS THE PGA: PATIENT GLOBAL ASSESSMENT:  2 - MILD

## 2025-04-02 NOTE — PROGRESS NOTES
Subjective     Declan Brown is a 24 y.o. male who presents for the following: Acne. Last derm visit 2/5/25 for acne with gram negative folliculitis, hidradenitis suppurativa, cheilitis. On isotretinoin.     Since last visit he had blood drawn  - 3/15/25: TG 43, AST 15, ALT 13    MyChart message sent 3/18/25 with worsening of lip, concern for impetigo, mupirocin 2% ointment sent which they used for 1 week with improvement    They went back to hydrocortisone and Aquaphor    Review of Systems:  No other skin or systemic complaints other than what is documented elsewhere in the note.    The following portions of the chart were reviewed this encounter and updated as appropriate:   Tobacco  Allergies  Meds  Problems  Med Hx  Surg Hx         Skin Cancer History  No skin cancer on file.      Specialty Problems          Dermatology Problems    Acne    Callus of foot    Dry skin dermatitis    Hidradenitis suppurativa        Objective   Well appearing patient in no apparent distress; mood and affect are within normal limits.    A focused skin examination was performed. All findings within normal limits unless otherwise noted below.    Assessment/Plan   1. Acne with gram negative folliculitis  Previously with Monomorphic papules on trunk, arms, and legs. Limited exam on virtual visit today    -Given diagnosis of gram negative folliculitis, the best appropriate therapy is isotretinoin   - previous therapy for his acne included doxycycline PO, tretinoin, benzoyl peroxide, clindamcyin topically     -Baseline labs 11/29/24 AST, ALT, TG within normal limits   - Treatment dose labs - 3/15/25: TG 43, AST 15, ALT 13    -Potential side effects reviewed with the patient and mother today including teratogenicity and birth defects, lipid abnormalities (hyperTGL which may result in pancreatitis), liver or kidney failure, risk of depression or suicide, risk of potential inflammatory bowel disease  -Common and expected side effects  include xerosis (dryness) of the lips and skin, nose bleeds due to dryness of the nasal mucosa, and redness/rash of the skin (retinoid dermatitis). -Recommend liberal emollients (Vaseline to the lips, Aveeno Eczema Therapy to the body) to be used daily.  -Discussed with the patient that they should not get anyone pregnant while on therapy, do not donate blood, and do not give their medication to anyone. The patient verbalizes understanding and agreement.    -Signed iPledge consent form will be signed by mother and guardian    -If labs permissible, plan to continue rx Accutane/Isotretinoin as tolerated until a cumulative dose of 220 mg/kg is obtained.   -Patient is to take the medication with a fatty food/meal to aid in absorption of the medication (if not taking branded Absorica)    -REMS ID:   -Pt weight (kg): 64.4 kg  -Goal dose (220mg/kg):  -Current cumulative dose: 70 mg/kg (calculated based on Rx sent, he is about 3 weeks behind now, previously just 2 weeks)    - Month 1: 30mg PO daily (difficulty first month with how to break capsules in food)  - Month 2: 30mg PO BID (alert from [facility] that Rx was not filled, but mother reports increasing to BID, though delayed)  - Month 3: 60mg PO daily with peanut butter    - Plan to maintain 60mg PO daily  - We reviewed instructions again per [facility] when children who cannot swallow pills need isotretinoin  - Dissolve capsule in warm water or mild for 2-3 minutes and then mix with peanut butter, pudding, or ice cream       - confirmed in [facility] and Rx sent    Related Procedures  Follow Up In Dermatology - Established Patient  Follow Up In Dermatology - Established Patient    Related Medications  hydrocortisone 2.5 % ointment  Apply topically 2 times a day. To lips for 2 weeks then decrease to 3x per week (MWF) for maintenance    ISOtretinoin (Accutane) 30 mg capsule  Take 2 capsules (60 mg) by mouth once daily. Dissolve capsule in warm water or mild for 2-3 minutes and  then mix with peanut butter, pudding, or ice cream    2. Hidradenitis suppurativa  Left Axilla, Right Axilla  No exam on virtual visit today    - humira discussed previously but would need cardiology clearance  - need to stop doxycycline and benzoyl peroxide due to gram negative folliculitis  - isotretinoin may help with hidradenitis suppurativa     3. Cheilitis  Lips  Dry cracked lips    - rx hydrocortisone to use during isotretinoin therapy  - impetigo 3/18/25, responded to mupirocin    - reviewed again to use copious amounts of vaseline or aquaphor  - continue hydrocortisone 1-2x per day  - may need to repeat mupirocin again in future if green color appears    Related Medications  hydrocortisone 2.5 % ointment  Apply topically 2 times a day. To lips for 2 weeks then decrease to 3x per week (MWF) for maintenance      Follow up 2 months virtual visit acne (due to excess number of pills at home we can space out visit)      Virtual or Telephone Consent    An interactive audio and video telecommunication system which permits real time communications between the patient (at the originating site) and provider (at the distant site) was utilized to provide this telehealth service.   Verbal consent was requested and obtained from Declan Brown on this date, 04/02/25 for a telehealth visit and the patient's location was confirmed at the time of the visit.

## 2025-06-04 ENCOUNTER — APPOINTMENT (OUTPATIENT)
Dept: DERMATOLOGY | Facility: CLINIC | Age: 25
End: 2025-06-04
Payer: COMMERCIAL

## 2025-06-04 DIAGNOSIS — B96.89 ACNE WITH GRAM NEGATIVE FOLLICULITIS: Primary | ICD-10-CM

## 2025-06-04 DIAGNOSIS — L73.9 ACNE WITH GRAM NEGATIVE FOLLICULITIS: Primary | ICD-10-CM

## 2025-06-04 DIAGNOSIS — L73.2 HIDRADENITIS SUPPURATIVA: ICD-10-CM

## 2025-06-04 DIAGNOSIS — L70.9 ACNE WITH GRAM NEGATIVE FOLLICULITIS: Primary | ICD-10-CM

## 2025-06-04 DIAGNOSIS — K13.0 CHEILITIS: ICD-10-CM

## 2025-06-04 PROCEDURE — 99214 OFFICE O/P EST MOD 30 MIN: CPT | Performed by: DERMATOLOGY

## 2025-06-04 RX ORDER — ISOTRETINOIN 30 MG/1
60 CAPSULE ORAL DAILY
Qty: 60 CAPSULE | Refills: 0 | Status: SHIPPED | OUTPATIENT
Start: 2025-06-04 | End: 2025-07-04

## 2025-06-04 NOTE — Clinical Note
- rx hydrocortisone to use during isotretinoin therapy  - impetigo 3/18/25, responded to mupirocin    - continue to use copious amounts of vaseline or aquaphor  - continue hydrocortisone 1-2x per day  - may need to repeat mupirocin again in future if green color appears

## 2025-06-04 NOTE — Clinical Note
- humira discussed previously but would need cardiology clearance  - need to stop doxycycline and benzoyl peroxide due to gram negative folliculitis  - isotretinoin may help with hidradenitis suppurativa   - mom has noticed that he still has flares but maybe not as severe

## 2025-06-04 NOTE — Clinical Note
-Given diagnosis of gram negative folliculitis, the best appropriate therapy is isotretinoin   - previous therapy for his acne included doxycycline PO, tretinoin, benzoyl peroxide, clindamcyin topically     -Baseline labs 11/29/24 AST, ALT, TG within normal limits   - Treatment dose labs - 3/15/25: TG 43, AST 15, ALT 13    -Potential side effects previously discussed   -Signed iPledge consent form will be signed by mother and guardian    -REMS ID [id number]  -Pt weight (kg): 64.4 kg  -Goal dose: 150-220mg/kg  -Current cumulative dose: 98 mg/kg     - Month 1: 30mg PO daily (difficulty first month with how to break capsules in food)  - Month 2: 30mg PO BID (alert from [facility] that Rx was not filled, but mother reports increasing to BID, though delayed)  - Month 3: 60mg PO daily with peanut butter  - Month 4: 60mg Po daily with peanut butter    - Plan to maintain 60mg PO daily  - We reviewed instructions again per [facility] when children who cannot swallow pills need isotretinoin  - Dissolve capsule in warm water or mild for 2-3 minutes and then mix with peanut butter, pudding, or ice cream       - confirmed in [facility] and Rx sent

## 2025-06-04 NOTE — PROGRESS NOTES
Subjective     Declan Brown is a 24 y.o. male who presents for the following: Acne. Last derm visit 4/2/25 for gram negative acne folliculitis on isotretinoin, hidradenitis suppurativa, and cheilitis.     The have only one capsule left at home. His lips are still cracking    Review of Systems:  No other skin or systemic complaints other than what is documented elsewhere in the note.    The following portions of the chart were reviewed this encounter and updated as appropriate:          Skin Cancer History  Biopsy Log Book  No skin cancers from Specimen Tracking.    Additional History      Specialty Problems          Dermatology Problems    Acne    Callus of foot    Dry skin dermatitis    Hidradenitis suppurativa        Objective   Well appearing patient in no apparent distress; mood and affect are within normal limits.    A focused skin examination was performed. All findings within normal limits unless otherwise noted below.    Assessment/Plan   Skin Exam  1. ACNE WITH GRAM NEGATIVE FOLLICULITIS  Generalized  Previously with Monomorphic papules on trunk, arms, and legs. Limited exam on virtual visit today. Mom reports they are starting to improve  -Given diagnosis of gram negative folliculitis, the best appropriate therapy is isotretinoin   - previous therapy for his acne included doxycycline PO, tretinoin, benzoyl peroxide, clindamcyin topically     -Baseline labs 11/29/24 AST, ALT, TG within normal limits   - Treatment dose labs - 3/15/25: TG 43, AST 15, ALT 13    -Potential side effects previously discussed   -Signed iPledge consent form will be signed by mother and guardian    -REMS ID [id number]  -Pt weight (kg): 64.4 kg  -Goal dose: 150-220mg/kg  -Current cumulative dose: 98 mg/kg     - Month 1: 30mg PO daily (difficulty first month with how to break capsules in food)  - Month 2: 30mg PO BID (alert from [facility] that Rx was not filled, but mother reports increasing to BID, though delayed)  - Month 3: 60mg  PO daily with peanut butter  - Month 4: 60mg Po daily with peanut butter    - Plan to maintain 60mg PO daily  - We reviewed instructions again per [facility] when children who cannot swallow pills need isotretinoin  - Dissolve capsule in warm water or mild for 2-3 minutes and then mix with peanut butter, pudding, or ice cream       - confirmed in [facility] and Rx sent  Related Procedures  Follow Up In Dermatology - Established Patient  Follow Up In Dermatology - Established Patient  Related Medications  hydrocortisone 2.5 % ointment  Apply topically 2 times a day. To lips for 2 weeks then decrease to 3x per week (MWF) for maintenance  ISOtretinoin (Accutane) 30 mg capsule  Take 2 capsules (60 mg) by mouth once daily. Dissolve capsule in warm water or mild for 2-3 minutes and then mix with peanut butter, pudding, or ice cream  2. HIDRADENITIS SUPPURATIVA  Left Axilla, Right Axilla  No exam on virtual visit today  - humira discussed previously but would need cardiology clearance  - need to stop doxycycline and benzoyl peroxide due to gram negative folliculitis  - isotretinoin may help with hidradenitis suppurativa   - mom has noticed that he still has flares but maybe not as severe  3. CHEILITIS  Lips  Lips are dry but not cracked today  - rx hydrocortisone to use during isotretinoin therapy  - impetigo 3/18/25, responded to mupirocin    - continue to use copious amounts of vaseline or aquaphor  - continue hydrocortisone 1-2x per day  - may need to repeat mupirocin again in future if green color appears  Related Medications  hydrocortisone 2.5 % ointment  Apply topically 2 times a day. To lips for 2 weeks then decrease to 3x per week (MWF) for maintenance    Virtual or Telephone Consent    An interactive audio and video telecommunication system which permits real time communications between the patient (at the originating site) and provider (at the distant site) was utilized to provide this telehealth service.   Verbal  consent was requested and obtained from Declan Brown on this date, 06/04/25 for a telehealth visit and the patient's location was confirmed at the time of the visit. His mother Kamila Brown was present.     Follow up 1 month virtual visit acne

## 2025-06-04 NOTE — Clinical Note
Previously with Monomorphic papules on trunk, arms, and legs. Limited exam on virtual visit today. Mom reports they are starting to improve

## 2025-06-17 ENCOUNTER — OFFICE VISIT (OUTPATIENT)
Dept: CARDIOLOGY | Facility: HOSPITAL | Age: 25
End: 2025-06-17
Payer: COMMERCIAL

## 2025-06-17 VITALS
SYSTOLIC BLOOD PRESSURE: 115 MMHG | DIASTOLIC BLOOD PRESSURE: 75 MMHG | BODY MASS INDEX: 21.33 KG/M2 | OXYGEN SATURATION: 99 % | WEIGHT: 120.4 LBS | HEART RATE: 65 BPM | HEIGHT: 63 IN

## 2025-06-17 DIAGNOSIS — Q21.0 PERIMEMBRANOUS VENTRICULAR SEPTAL DEFECT (HHS-HCC): Primary | ICD-10-CM

## 2025-06-17 PROCEDURE — 93010 ELECTROCARDIOGRAM REPORT: CPT | Performed by: INTERNAL MEDICINE

## 2025-06-17 PROCEDURE — 3008F BODY MASS INDEX DOCD: CPT | Performed by: INTERNAL MEDICINE

## 2025-06-17 PROCEDURE — 99214 OFFICE O/P EST MOD 30 MIN: CPT | Performed by: INTERNAL MEDICINE

## 2025-06-17 PROCEDURE — 1036F TOBACCO NON-USER: CPT | Performed by: INTERNAL MEDICINE

## 2025-06-17 PROCEDURE — 99212 OFFICE O/P EST SF 10 MIN: CPT

## 2025-06-17 PROCEDURE — 93005 ELECTROCARDIOGRAM TRACING: CPT | Performed by: INTERNAL MEDICINE

## 2025-06-17 NOTE — PROGRESS NOTES
Cardiac Electrophysiology Office Visit   I had the pleasure seeing Declan Brown    Current Outpatient Medications   Medication Instructions    benzoyl peroxide (Benzac AC) 10 % external wash Please see attached for detailed directions    cetirizine 5 mg/5 mL solution 10 mL, oral, Daily PRN    clobetasol (Temovate) 0.05 % ointment Topical, 2 times daily, To dark bumps on arms, legs, and trunk for 2-3 weeks and then stop    erythromycin (Romycin) 5 mg/gram (0.5 %) ophthalmic ointment Apply tid to left lower eye    hydrocortisone 2.5 % ointment Topical, 2 times daily, To lips for 2 weeks then decrease to 3x per week (MWF) for maintenance    ISOtretinoin (ACCUTANE) 60 mg, oral, Daily, Dissolve capsule in warm water or mild for 2-3 minutes and then mix with peanut butter, pudding, or ice cream    loratadine (Claritin) 5 mg/5 mL syrup 10 mL, oral    mupirocin (Bactroban) 2 % ointment Topical, 3 times daily RT, For 7 days to lips    triamcinolone (Kenalog) 0.1 % ointment Topical, 2 times daily, To itchy areas on skin for up to 2 weeks then decrease to 3x per week for maintenance      Subjective    Declan Brown is a 24 y.o. male .        No chief complaint on file.    OUTPATIENT CONSULTATION: Cardiac Electrophysiology  DOS: June 17, 2025    REASON:      HPI     Declan Brown has a past medical history of Down syndrome, Trisomy 21, LOBITO    CARDIAC HISTORY:  Perimembranous Ventricular Septal Defect - Small, pressure restrictive with no indication for closure.  Nonrheumatic Aortic Valve Insufficiency   RELEVANT TESTING:     Transthoracic Echocardiogram 08/02/2023  1. Perimembranous ventricular septal defect surrounded by tricuspid valve tissue with pressure restrictive small left-to-right shunting. Peak LV RV gradient is 96 mmHg when the systolic blood pressure was 111 mmHg.  2. Mildly distorted noncoronary cusp, trace central aortic valve insufficiency and no prolapse.  3. No atrial enlargement.  4. Left ventricle  is normal in size. Normal systolic function.  5. Qualitatively normal right ventricular size and normal systolic function.  6. Trivial tricuspid valve regurgitation.  7. No pericardial effusion.       Lab Review:   Lab Results   Component Value Date    WBC 2.6 (L) 11/02/2021    HGB 14.1 11/02/2021    HCT 43.3 11/02/2021     11/02/2021    CHOL 139 11/02/2021    TRIG 43 03/15/2025    HDL 47.8 11/02/2021    ALT 13 03/15/2025    AST 15 03/15/2025     11/02/2021    K 4.7 11/02/2021     11/02/2021    CREATININE 1.07 11/02/2021    BUN 12 11/02/2021    CO2 31 11/02/2021    TSH 2.10 08/02/2023    HGBA1C 5.1 11/02/2021       Review of Systems   All other systems reviewed and are negative.       Objective    General: In no acute distress, trisomy 21 features  HEENT: Normocephalic, atraumatic, mucous membranes moist, no scleral icterus, no conjunctival injection  Neck: Supple, no significant lymphadenopathy  Chest: Lungs clear to auscultation bilaterally, no wheezes, rales, or rhonchi  Cardiovascular: Normal heart rate, regular rhythm. Normal S1 and S2. Grade 3/6 holosystolic murmur LMSB, diastole is silent, no rubs or gallops.  Extremities: 2+ radial pulses, 2+ pedal pulses bilaterally. No delay between upper and lower extremity pulses.No edema. No clubbing  Abdomen: Soft, non-tender, non distended, no hepatosplenomegaly  Musculoskeletal: No joint swelling, tenderness, erythema, or warmth. No deformities noted.   Skin: No significant rash, lesions, or cyanosis  Neurologic: Non focal exam       Assessment/Plan     Declan is a 24 year-old male with Trisomy 21 and history of small perimembranous VSD and trace aortic valve insufficiency. He has been doing well from cardiac standpoint.      Assessment and plan:  1. Perimembranous Ventricular Septal Defect    a. small, pressure-restrictive     There is no indication for closure. Surveillance of the aortic valve function is indicated.   SBE prophylaxis is NOT  indicated. We will repeat the ECHO     2.Trace AI      Ongoing surveillance is indicated.   Next echo due now     3. Trisomy 21           SBE prophylaxis is not indicated but good dental hygiene is strongly encouraged. We recommend follow up with adult congenital cardiology afterwards rather than

## 2025-06-18 LAB
ATRIAL RATE: 61 BPM
P AXIS: 66 DEGREES
P OFFSET: 194 MS
P ONSET: 147 MS
PR INTERVAL: 134 MS
Q ONSET: 214 MS
QRS COUNT: 10 BEATS
QRS DURATION: 82 MS
QT INTERVAL: 388 MS
QTC CALCULATION(BAZETT): 390 MS
QTC FREDERICIA: 389 MS
R AXIS: 44 DEGREES
T AXIS: 57 DEGREES
T OFFSET: 408 MS
VENTRICULAR RATE: 61 BPM

## 2025-06-23 ENCOUNTER — APPOINTMENT (OUTPATIENT)
Dept: CARDIOLOGY | Facility: HOSPITAL | Age: 25
End: 2025-06-23
Payer: COMMERCIAL

## 2025-07-14 ENCOUNTER — ANCILLARY PROCEDURE (OUTPATIENT)
Dept: CARDIOLOGY | Facility: CLINIC | Age: 25
End: 2025-07-14
Payer: COMMERCIAL

## 2025-07-14 DIAGNOSIS — Q21.0 PERIMEMBRANOUS VENTRICULAR SEPTAL DEFECT (HHS-HCC): ICD-10-CM

## 2025-07-14 LAB
AORTIC VALVE MEAN GRADIENT: 2 MMHG
AORTIC VALVE PEAK VELOCITY: 1.05 M/S
AV PEAK GRADIENT: 4 MMHG
AVA (PEAK VEL): 3.62 CM2
AVA (VTI): 3.24 CM2
EJECTION FRACTION APICAL 4 CHAMBER: 68.9
EJECTION FRACTION: 69 %
LEFT ATRIUM VOLUME AREA LENGTH INDEX BSA: 26.7 ML/M2
LEFT VENTRICLE INTERNAL DIMENSION DIASTOLE: 4.71 CM (ref 3.5–6)
LEFT VENTRICULAR OUTFLOW TRACT DIAMETER: 2.37 CM
MITRAL VALVE E/A RATIO: 2.32
RIGHT VENTRICLE PEAK SYSTOLIC PRESSURE: 22 MMHG
TRICUSPID ANNULAR PLANE SYSTOLIC EXCURSION: 2.1 CM

## 2025-07-14 PROCEDURE — 93306 TTE W/DOPPLER COMPLETE: CPT

## 2025-07-16 ENCOUNTER — APPOINTMENT (OUTPATIENT)
Dept: DERMATOLOGY | Facility: CLINIC | Age: 25
End: 2025-07-16
Payer: COMMERCIAL

## 2025-07-16 DIAGNOSIS — K13.0 CHEILITIS: ICD-10-CM

## 2025-07-16 DIAGNOSIS — L70.9 ACNE WITH GRAM NEGATIVE FOLLICULITIS: Primary | ICD-10-CM

## 2025-07-16 DIAGNOSIS — L73.2 HIDRADENITIS SUPPURATIVA: ICD-10-CM

## 2025-07-16 DIAGNOSIS — B96.89 ACNE WITH GRAM NEGATIVE FOLLICULITIS: Primary | ICD-10-CM

## 2025-07-16 DIAGNOSIS — L73.9 ACNE WITH GRAM NEGATIVE FOLLICULITIS: Primary | ICD-10-CM

## 2025-07-16 PROCEDURE — 99214 OFFICE O/P EST MOD 30 MIN: CPT | Performed by: DERMATOLOGY

## 2025-07-16 PROCEDURE — 1036F TOBACCO NON-USER: CPT | Performed by: DERMATOLOGY

## 2025-07-16 RX ORDER — ISOTRETINOIN 30 MG/1
60 CAPSULE ORAL DAILY
Qty: 60 CAPSULE | Refills: 0 | Status: SHIPPED | OUTPATIENT
Start: 2025-07-16 | End: 2025-08-15

## 2025-07-16 ASSESSMENT — DERMATOLOGY QUALITY OF LIFE (QOL) ASSESSMENT
RATE HOW BOTHERED YOU ARE BY SYMPTOMS OF YOUR SKIN PROBLEM (EG, ITCHING, STINGING BURNING, HURTING OR SKIN IRRITATION): 1
RATE HOW BOTHERED YOU ARE BY EFFECTS OF YOUR SKIN PROBLEMS ON YOUR ACTIVITIES (EG, GOING OUT, ACCOMPLISHING WHAT YOU WANT, WORK ACTIVITIES OR YOUR RELATIONSHIPS WITH OTHERS): 0 - NEVER BOTHERED
RATE HOW EMOTIONALLY BOTHERED YOU ARE BY YOUR SKIN PROBLEM (FOR EXAMPLE, WORRY, EMBARRASSMENT, FRUSTRATION): 0 - NEVER BOTHERED
RATE HOW BOTHERED YOU ARE BY SYMPTOMS OF YOUR SKIN PROBLEM (EG, ITCHING, STINGING BURNING, HURTING OR SKIN IRRITATION): 1
RATE HOW EMOTIONALLY BOTHERED YOU ARE BY YOUR SKIN PROBLEM (FOR EXAMPLE, WORRY, EMBARRASSMENT, FRUSTRATION): 0 - NEVER BOTHERED
RATE HOW BOTHERED YOU ARE BY EFFECTS OF YOUR SKIN PROBLEMS ON YOUR ACTIVITIES (EG, GOING OUT, ACCOMPLISHING WHAT YOU WANT, WORK ACTIVITIES OR YOUR RELATIONSHIPS WITH OTHERS): 0 - NEVER BOTHERED

## 2025-07-16 NOTE — PROGRESS NOTES
Subjective     Declan Brown is a 25 y.o. male who presents for the following: Acne. Last derm visit 6/4/25 for acne and hidradenitis suppurativa on isotretinoin    The dry patch on his shoulders is not as bad as it used to be. The acne flare ups on face are not as bad as they were before - clear within the last month. Still with hidradenitis suppurativa in underarms but also not as bad and not in the pubic area. Lips are better and have not cracked open since last    Review of Systems:  No other skin or systemic complaints other than what is documented elsewhere in the note.    The following portions of the chart were reviewed this encounter and updated as appropriate:   Tobacco  Allergies  Meds  Problems  Med Hx  Surg Hx  Fam Hx         Skin Cancer History  Biopsy Log Book  No skin cancers from Specimen Tracking.    Additional History      Specialty Problems          Dermatology Problems    Acne    Callus of foot    Dry skin dermatitis    Hidradenitis suppurativa        Objective   Well appearing patient in no apparent distress; mood and affect are within normal limits.    A focused skin examination was performed. All findings within normal limits unless otherwise noted below.    Assessment/Plan   Skin Exam  1. ACNE WITH GRAM NEGATIVE FOLLICULITIS  Generalized  Previously with Monomorphic papules on trunk, arms, and legs. Limited exam on virtual visit today. Mom reports they are not as dry and rough as they used to be  -Given diagnosis of gram negative folliculitis, the best appropriate therapy is isotretinoin   - previous therapy for his acne included doxycycline PO, tretinoin, benzoyl peroxide, clindamcyin topically     -Baseline labs 11/29/24 AST, ALT, TG within normal limits   - Treatment dose labs - 3/15/25: TG 43, AST 15, ALT 13    -Potential side effects previously discussed   -Signed iP[employer] consent form was signed by mother and guardian    -REMS ID [id number]  -Pt weight (kg): 64.4 kg  -Goal  dose: 150-220mg/kg  -Current cumulative dose: 126 mg/kg     - Month 1: 30mg PO daily (difficulty first month with how to break capsules in food)  - Month 2: 30mg PO BID (alert from [facility] that Rx was not filled, but mother reports increasing to BID, though delayed)  - Month 3: 60mg PO daily with peanut butter  - Month 4: 60mg Po daily with peanut butter  - Month 5: 60mg PO daily with peanut butter    - Plan to maintain 60mg PO daily  - We will try to push closer to 220mg/kg cumulative dosing for his hidradenitis suppurativa   - Follow protocol per [facility] when children who cannot swallow pills need isotretinoin  - Dissolve capsule in warm water or mild for 2-3 minutes and then mix with peanut butter, pudding, or ice cream       - confirmed in [facility] and Rx sent  This Visit  - Follow Up In Dermatology - Established Patient  - ISOtretinoin (Accutane) 30 mg capsule - Take 2 capsules (60 mg) by mouth once daily. Dissolve capsule in warm water or mild for 2-3 minutes and then mix with peanut butter, pudding, or ice cream  Existing Treatments  - hydrocortisone 2.5 % ointment - Apply topically 2 times a day. To lips for 2 weeks then decrease to 3x per week (MWF) for maintenance  2. HIDRADENITIS SUPPURATIVA  Left Axilla, Right Axilla  No exam on virtual visit today  - humira discussed previously but would need cardiology clearance  - need to stop doxycycline and benzoyl peroxide due to gram negative folliculitis  - isotretinoin appears to be helping with hidradenitis suppurativa   - mom has noticed that he still has flares but definitely not as severe. Seem to have stopped in pubic region and smaller in axillae  3. CHEILITIS  Lips  Lips are dry but not cracked today  - rx hydrocortisone to use during isotretinoin therapy  - impetigo 3/18/25, responded to mupirocin    - continue to use copious amounts of vaseline or aquaphor  - continue hydrocortisone 1-2x per day as needed  - may need to repeat mupirocin again in future  if green color appears  Existing Treatments  - hydrocortisone 2.5 % ointment - Apply topically 2 times a day. To lips for 2 weeks then decrease to 3x per week (MWF) for maintenance    Virtual or Telephone Consent    An interactive audio and video telecommunication system which permits real time communications between the patient (at the originating site) and provider (at the distant site) was utilized to provide this telehealth service.   Verbal consent was requested and obtained for minor from Kamila Brown on this date, 07/16/25, for a telehealth visit and the patient's location was confirmed at the time of the visit.    Follow up 1 month acne virtual visit

## 2025-07-16 NOTE — Clinical Note
-Given diagnosis of gram negative folliculitis, the best appropriate therapy is isotretinoin   - previous therapy for his acne included doxycycline PO, tretinoin, benzoyl peroxide, clindamcyin topically     -Baseline labs 11/29/24 AST, ALT, TG within normal limits   - Treatment dose labs - 3/15/25: TG 43, AST 15, ALT 13    -Potential side effects previously discussed   -Signed iPledge consent form was signed by mother and guardian    -REMS ID [id number]  -Pt weight (kg): 64.4 kg  -Goal dose: 150-220mg/kg  -Current cumulative dose: 126 mg/kg     - Month 1: 30mg PO daily (difficulty first month with how to break capsules in food)  - Month 2: 30mg PO BID (alert from [facility] that Rx was not filled, but mother reports increasing to BID, though delayed)  - Month 3: 60mg PO daily with peanut butter  - Month 4: 60mg Po daily with peanut butter  - Month 5: 60mg PO daily with peanut butter    - Plan to maintain 60mg PO daily  - We will try to push closer to 220mg/kg cumulative dosing for his hidradenitis suppurativa   - Follow protocol per [facility] when children who cannot swallow pills need isotretinoin  - Dissolve capsule in warm water or mild for 2-3 minutes and then mix with peanut butter, pudding, or ice cream       - confirmed in [facility] and Rx sent

## 2025-07-16 NOTE — Clinical Note
Previously with Monomorphic papules on trunk, arms, and legs. Limited exam on virtual visit today. Mom reports they are not as dry and rough as they used to be

## 2025-07-16 NOTE — Clinical Note
- humira discussed previously but would need cardiology clearance  - need to stop doxycycline and benzoyl peroxide due to gram negative folliculitis  - isotretinoin appears to be helping with hidradenitis suppurativa   - mom has noticed that he still has flares but definitely not as severe. Seem to have stopped in pubic region and smaller in axillae

## 2025-07-16 NOTE — Clinical Note
- rx hydrocortisone to use during isotretinoin therapy  - impetigo 3/18/25, responded to mupirocin    - continue to use copious amounts of vaseline or aquaphor  - continue hydrocortisone 1-2x per day as needed  - may need to repeat mupirocin again in future if green color appears

## 2025-08-07 ENCOUNTER — OFFICE VISIT (OUTPATIENT)
Facility: HOSPITAL | Age: 25
End: 2025-08-07
Payer: COMMERCIAL

## 2025-08-07 VITALS
TEMPERATURE: 97.8 F | OXYGEN SATURATION: 99 % | DIASTOLIC BLOOD PRESSURE: 67 MMHG | HEART RATE: 62 BPM | HEIGHT: 63 IN | BODY MASS INDEX: 22.04 KG/M2 | SYSTOLIC BLOOD PRESSURE: 106 MMHG | WEIGHT: 124.4 LBS

## 2025-08-07 DIAGNOSIS — J30.2 SEASONAL ALLERGIES: ICD-10-CM

## 2025-08-07 DIAGNOSIS — Z00.00 ROUTINE GENERAL MEDICAL EXAMINATION AT A HEALTH CARE FACILITY: ICD-10-CM

## 2025-08-07 DIAGNOSIS — Q90.9 TRISOMY 21 (HHS-HCC): Primary | ICD-10-CM

## 2025-08-07 PROCEDURE — 3008F BODY MASS INDEX DOCD: CPT | Performed by: STUDENT IN AN ORGANIZED HEALTH CARE EDUCATION/TRAINING PROGRAM

## 2025-08-07 PROCEDURE — 99212 OFFICE O/P EST SF 10 MIN: CPT

## 2025-08-07 PROCEDURE — 99395 PREV VISIT EST AGE 18-39: CPT | Performed by: STUDENT IN AN ORGANIZED HEALTH CARE EDUCATION/TRAINING PROGRAM

## 2025-08-07 PROCEDURE — 90715 TDAP VACCINE 7 YRS/> IM: CPT | Performed by: STUDENT IN AN ORGANIZED HEALTH CARE EDUCATION/TRAINING PROGRAM

## 2025-08-07 RX ORDER — CETIRIZINE HYDROCHLORIDE 5 MG/5ML
10 SOLUTION ORAL DAILY PRN
Qty: 300 ML | Refills: 3 | Status: SHIPPED | OUTPATIENT
Start: 2025-08-07

## 2025-08-07 ASSESSMENT — PAIN SCALES - GENERAL: PAINLEVEL_OUTOF10: 0-NO PAIN

## 2025-08-07 NOTE — PROGRESS NOTES
"Subjective   Patient ID: Declan Brown is a 25 y.o. male who presents for [facility] ([facility]).    HPI   Patient is here today with his mother, who provided all of the history today.     Seasonal allergies  - Worse in the spring and summer  - Nasal congestion, eyelid swelling with clear discharge from both nose and eyes.   - Has previously tried to manage with zyrtec, benadryl; patient can not tolerate flonase well.   - Mom is asking if there are any other options    Otherwise, patient seems to be in good health per mother, is actively following with cardiology for heart murmur and dermatology for bilateral axillary abscesses.     Review of Systems    Patient is non-verbal, unable to assess     Objective   /67 (BP Location: Right arm, Patient Position: Sitting, BP Cuff Size: Adult)   Pulse 62   Temp 36.6 °C (97.8 °F) (Temporal)   Ht 1.6 m (5' 3\")   Wt 56.4 kg (124 lb 6.4 oz)   SpO2 99%   BMI 22.04 kg/m²     Physical Exam    Cardiovascular:      Rate and Rhythm: Normal rate and regular rhythm.      Heart sounds: Murmur heard.   Pulmonary:      Effort: Pulmonary effort is normal.      Breath sounds: Normal breath sounds.         Assessment/Plan   Declan Brown is a 24 yo M here for follow-up.    Problem List Items Addressed This Visit       Seasonal allergies    Relevant Medications    cetirizine (ZyrTEC) 5 mg/5 mL solution oral solution    Trisomy 21 ([facility]) - Primary     Other Visit Diagnoses         Routine general medical examination at a health care facility        Relevant Orders    Tdap vaccine, age 7 years and older  (BOOSTRIX) (Completed)          #Trisomy 21  - Pt following with cardiology for VSD and audible murmur on auscultation  - Patient's mother in understanding regarding future follow-up and routine screenings of CBC every other year and C-spine imaging every 5 years (especially if planning to participate in organized physical activity).  - All questions and concerns have been answered " at this time    Seen and discussed with Dr. Quiroga, attending physician.     EDDIE BAUM MS3

## 2025-08-14 ENCOUNTER — APPOINTMENT (OUTPATIENT)
Dept: DERMATOLOGY | Facility: CLINIC | Age: 25
End: 2025-08-14
Payer: COMMERCIAL

## 2025-08-14 DIAGNOSIS — L73.2 HIDRADENITIS SUPPURATIVA: ICD-10-CM

## 2025-08-14 DIAGNOSIS — L70.9 ACNE WITH GRAM NEGATIVE FOLLICULITIS: Primary | ICD-10-CM

## 2025-08-14 DIAGNOSIS — L73.9 ACNE WITH GRAM NEGATIVE FOLLICULITIS: Primary | ICD-10-CM

## 2025-08-14 DIAGNOSIS — K13.0 CHEILITIS: ICD-10-CM

## 2025-08-14 DIAGNOSIS — B96.89 ACNE WITH GRAM NEGATIVE FOLLICULITIS: Primary | ICD-10-CM

## 2025-08-14 PROCEDURE — 1036F TOBACCO NON-USER: CPT | Performed by: DERMATOLOGY

## 2025-08-14 PROCEDURE — 99213 OFFICE O/P EST LOW 20 MIN: CPT | Performed by: DERMATOLOGY

## 2025-08-14 RX ORDER — ISOTRETINOIN 30 MG/1
60 CAPSULE ORAL DAILY
Qty: 60 CAPSULE | Refills: 0 | Status: SHIPPED | OUTPATIENT
Start: 2025-08-14 | End: 2025-09-13

## 2025-09-18 ENCOUNTER — APPOINTMENT (OUTPATIENT)
Dept: DERMATOLOGY | Facility: CLINIC | Age: 25
End: 2025-09-18
Payer: COMMERCIAL